# Patient Record
Sex: FEMALE | URBAN - METROPOLITAN AREA
[De-identification: names, ages, dates, MRNs, and addresses within clinical notes are randomized per-mention and may not be internally consistent; named-entity substitution may affect disease eponyms.]

---

## 2022-06-14 ENCOUNTER — APPOINTMENT (RX ONLY)
Dept: URBAN - METROPOLITAN AREA CLINIC 151 | Facility: CLINIC | Age: 32
Setting detail: DERMATOLOGY
End: 2022-06-14

## 2022-06-14 DIAGNOSIS — L73.8 OTHER SPECIFIED FOLLICULAR DISORDERS: ICD-10-CM

## 2022-06-14 DIAGNOSIS — L259 CONTACT DERMATITIS AND OTHER ECZEMA, UNSPECIFIED CAUSE: ICD-10-CM

## 2022-06-14 PROBLEM — L23.9 ALLERGIC CONTACT DERMATITIS, UNSPECIFIED CAUSE: Status: ACTIVE | Noted: 2022-06-14

## 2022-06-14 PROCEDURE — ? PRESCRIPTION

## 2022-06-14 PROCEDURE — ? DIAGNOSIS COMMENT

## 2022-06-14 PROCEDURE — ? RECOMMENDATIONS

## 2022-06-14 PROCEDURE — 99203 OFFICE O/P NEW LOW 30 MIN: CPT

## 2022-06-14 PROCEDURE — ? PRESCRIPTION MEDICATION MANAGEMENT

## 2022-06-14 PROCEDURE — ? PHOTO-DOCUMENTATION

## 2022-06-14 PROCEDURE — ? COUNSELING

## 2022-06-14 RX ORDER — DESONIDE 0.5 MG/G
OINTMENT TOPICAL
Qty: 15 | Refills: 0 | Status: ERX | COMMUNITY
Start: 2022-06-14

## 2022-06-14 RX ORDER — CLINDAMYCIN PHOSPHATE 10 MG/ML
LOTION TOPICAL
Qty: 60 | Refills: 1 | Status: ERX | COMMUNITY
Start: 2022-06-14

## 2022-06-14 RX ADMIN — DESONIDE: 0.5 OINTMENT TOPICAL at 00:00

## 2022-06-14 RX ADMIN — CLINDAMYCIN PHOSPHATE: 10 LOTION TOPICAL at 00:00

## 2022-06-14 ASSESSMENT — LOCATION DETAILED DESCRIPTION DERM
LOCATION DETAILED: RIGHT POSTERIOR AXILLA
LOCATION DETAILED: SUPERIOR MID FOREHEAD
LOCATION DETAILED: LEFT POSTERIOR AXILLA
LOCATION DETAILED: RIGHT INFERIOR MEDIAL FOREHEAD

## 2022-06-14 ASSESSMENT — LOCATION SIMPLE DESCRIPTION DERM
LOCATION SIMPLE: SUPERIOR FOREHEAD
LOCATION SIMPLE: RIGHT FOREHEAD
LOCATION SIMPLE: LEFT POSTERIOR AXILLA
LOCATION SIMPLE: RIGHT POSTERIOR AXILLA

## 2022-06-14 ASSESSMENT — LOCATION ZONE DERM
LOCATION ZONE: AXILLAE
LOCATION ZONE: FACE

## 2022-06-14 NOTE — PROCEDURE: PRESCRIPTION MEDICATION MANAGEMENT
Initiate Treatment: Desonide ointment BID x2 weeks\\nClindamycin lotion BID (after Desonide use)
Detail Level: Zone
Render In Strict Bullet Format?: No

## 2022-06-14 NOTE — PROCEDURE: RECOMMENDATIONS
Detail Level: Zone
Recommendation Preamble: The following recommendations were made during the visit:
Render Risk Assessment In Note?: no
Recommendations (Free Text): ED in fall if desired

## 2022-06-14 NOTE — PROCEDURE: DIAGNOSIS COMMENT
Detail Level: Simple
Render Risk Assessment In Note?: no
Comment: Advised to d/c using anti-perspirants for now, and may try again once ACD clears.  dove bar soap only free and clear detergents, no dryer sheets

## 2025-02-06 ENCOUNTER — APPOINTMENT (OUTPATIENT)
Dept: URBAN - METROPOLITAN AREA CLINIC 151 | Facility: CLINIC | Age: 35
Setting detail: DERMATOLOGY
End: 2025-02-06

## 2025-02-06 DIAGNOSIS — D22 MELANOCYTIC NEVI: ICD-10-CM

## 2025-02-06 DIAGNOSIS — L81.4 OTHER MELANIN HYPERPIGMENTATION: ICD-10-CM

## 2025-02-06 DIAGNOSIS — D18.0 HEMANGIOMA: ICD-10-CM

## 2025-02-06 DIAGNOSIS — L73.8 OTHER SPECIFIED FOLLICULAR DISORDERS: ICD-10-CM

## 2025-02-06 DIAGNOSIS — L82.1 OTHER SEBORRHEIC KERATOSIS: ICD-10-CM

## 2025-02-06 DIAGNOSIS — L57.8 OTHER SKIN CHANGES DUE TO CHRONIC EXPOSURE TO NONIONIZING RADIATION: ICD-10-CM

## 2025-02-06 PROBLEM — D22.5 MELANOCYTIC NEVI OF TRUNK: Status: ACTIVE | Noted: 2025-02-06

## 2025-02-06 PROBLEM — D18.01 HEMANGIOMA OF SKIN AND SUBCUTANEOUS TISSUE: Status: ACTIVE | Noted: 2025-02-06

## 2025-02-06 PROCEDURE — 99214 OFFICE O/P EST MOD 30 MIN: CPT

## 2025-02-06 PROCEDURE — ? COUNSELING

## 2025-02-06 PROCEDURE — ? SUNSCREEN RECOMMENDATIONS

## 2025-02-06 PROCEDURE — ? PRESCRIPTION MEDICATION MANAGEMENT

## 2025-02-06 PROCEDURE — ? BENIGN DESTRUCTION COSMETIC

## 2025-02-06 PROCEDURE — ? PATIENT EDUCATION

## 2025-02-06 PROCEDURE — ? RECOMMENDATIONS

## 2025-02-06 PROCEDURE — ? DIAGNOSIS COMMENT

## 2025-02-06 ASSESSMENT — LOCATION DETAILED DESCRIPTION DERM
LOCATION DETAILED: MID TRAPEZIAL NECK
LOCATION DETAILED: LEFT MEDIAL FOREHEAD
LOCATION DETAILED: SUPERIOR THORACIC SPINE
LOCATION DETAILED: INFERIOR THORACIC SPINE
LOCATION DETAILED: SUPERIOR THORACIC SPINE

## 2025-02-06 ASSESSMENT — LOCATION SIMPLE DESCRIPTION DERM
LOCATION SIMPLE: LEFT FOREHEAD
LOCATION SIMPLE: UPPER BACK
LOCATION SIMPLE: UPPER BACK
LOCATION SIMPLE: TRAPEZIAL NECK

## 2025-02-06 ASSESSMENT — LOCATION ZONE DERM
LOCATION ZONE: TRUNK
LOCATION ZONE: NECK
LOCATION ZONE: FACE
LOCATION ZONE: TRUNK

## 2025-02-06 NOTE — PROCEDURE: RECOMMENDATIONS
Detail Level: Zone
Recommendation Override: I recommended the following:\\n\\nSunscreen - CHEMICAL BLOCKERS:\\n\\nEucerin Daily Protection Face Lotion\\nNeutrogena Invisible Daily Defense Serum\\nCeraVe Ultra-Light Moisturizing Lotion\\nNeutrogena Hydroboost\\nCetaphil Face Moisturizer\\nLa Roche Posay Toleriane Double Repair Facial Moisturizer\\nElta MD UV Clear\\nLa Roche Posay Antihelios Melt-In Milk Sunscreen\\n\\nPHYSICAL BLOCKERS:\\n\\nAveeno Positively Mineral Sunscreen\\nCetaphil Sheer Mineral Face\\nCeraVe Hydrating Mineral Sunscreen (sheer tint)\\nLa Roche Posay Antihelios Mineral Ultra LIght Fluid Face Sunscreen\\nElta MD UV Daily\\nAvene Solaire UV MIneral Multi-Defense Sunscreen\\nSupergoop CC Screen (multiple shades)
Render Risk Assessment In Note?: no
Recommendation Preamble: The following recommendations were made during the visit:

## 2025-02-06 NOTE — PROCEDURE: BENIGN DESTRUCTION COSMETIC
Detail Level: Detailed
Consent: The patient's consent was obtained including but not limited to risks of crusting, scabbing, blistering, scarring, darker or lighter pigmentary change, recurrence, incomplete removal and infection.
Anesthesia Volume In Cc: 0.5
Post-Care Instructions: I reviewed with the patient in detail post-care instructions. Patient is to wear sunprotection, and avoid picking at any of the treated lesions. Pt may apply Vaseline to crusted or scabbing areas.
Price (Use Numbers Only, No Special Characters Or $): 50

## 2025-02-06 NOTE — PROCEDURE: DIAGNOSIS COMMENT
Comment: Right upper outer shoulder: 5mm x 4mm brown thin papule \\n\\nRight knee: 1mm x 1.5mm square brown macule darker walter in center\\n\\nLeft foot, laterally: 7mm x 4.5mm brown papule \\n\\nMid back: 4mm x 3mm brown papule \\n\\nRecommend annual FBSE or sooner if concerns.
Detail Level: Simple
Render Risk Assessment In Note?: no
Comment: If 2nd treatment is needed, will do at not charge.

## 2025-04-21 ENCOUNTER — APPOINTMENT (OUTPATIENT)
Dept: URBAN - METROPOLITAN AREA CLINIC 151 | Facility: CLINIC | Age: 35
Setting detail: DERMATOLOGY
End: 2025-04-21

## 2025-04-21 DIAGNOSIS — L70.8 OTHER ACNE: ICD-10-CM

## 2025-04-21 DIAGNOSIS — L56.8 OTHER SPECIFIED ACUTE SKIN CHANGES DUE TO ULTRAVIOLET RADIATION: ICD-10-CM

## 2025-04-21 DIAGNOSIS — L73.8 OTHER SPECIFIED FOLLICULAR DISORDERS: ICD-10-CM

## 2025-04-21 PROCEDURE — ? ACNE SURGERY

## 2025-04-21 PROCEDURE — 99213 OFFICE O/P EST LOW 20 MIN: CPT | Mod: 25

## 2025-04-21 PROCEDURE — ? COUNSELING

## 2025-04-21 PROCEDURE — ? DIAGNOSIS COMMENT

## 2025-04-21 PROCEDURE — 10040 EXTRACTION: CPT

## 2025-04-21 ASSESSMENT — LOCATION ZONE DERM
LOCATION ZONE: TRUNK
LOCATION ZONE: FACE

## 2025-04-21 ASSESSMENT — LOCATION SIMPLE DESCRIPTION DERM
LOCATION SIMPLE: LEFT FOREHEAD
LOCATION SIMPLE: ABDOMEN

## 2025-04-21 ASSESSMENT — LOCATION DETAILED DESCRIPTION DERM
LOCATION DETAILED: PERIUMBILICAL SKIN
LOCATION DETAILED: LEFT MEDIAL FOREHEAD

## 2025-04-21 ASSESSMENT — SEVERITY ASSESSMENT: SEVERITY: MILD TO MODERATE

## 2025-04-21 NOTE — PROCEDURE: ACNE SURGERY
Hemostasis: Pressure
Post-Care Instructions: I reviewed with the patient in detail post-care instructions. Patient is to keep the treatment areas dry overnight, and then apply bacitracin twice daily until healed. Patient may apply hydrogen peroxide soaks to remove any crusting.
Prep Text (Optional): Prior to removal the treatment areas were prepped in the usual fashion.
Detail Level: Detailed
Render Number Of Lesions Treated: yes
Acne Type: Comedonal Lesions
Consent was obtained and risks were reviewed including but not limited to scarring, infection, bleeding, scabbing, incomplete removal, and allergy to anesthesia.
Extraction Method: comedo extractor